# Patient Record
Sex: FEMALE | Race: WHITE
[De-identification: names, ages, dates, MRNs, and addresses within clinical notes are randomized per-mention and may not be internally consistent; named-entity substitution may affect disease eponyms.]

---

## 2021-05-14 ENCOUNTER — HOSPITAL ENCOUNTER (OUTPATIENT)
Dept: HOSPITAL 53 - M SDC | Age: 26
Discharge: HOME | End: 2021-05-14
Attending: SURGERY
Payer: COMMERCIAL

## 2021-05-14 VITALS — BODY MASS INDEX: 24.88 KG/M2 | WEIGHT: 168 LBS | HEIGHT: 69 IN

## 2021-05-14 VITALS — DIASTOLIC BLOOD PRESSURE: 74 MMHG | SYSTOLIC BLOOD PRESSURE: 113 MMHG

## 2021-05-14 DIAGNOSIS — R06.83: ICD-10-CM

## 2021-05-14 DIAGNOSIS — F43.10: ICD-10-CM

## 2021-05-14 DIAGNOSIS — Z88.2: ICD-10-CM

## 2021-05-14 DIAGNOSIS — D17.21: ICD-10-CM

## 2021-05-14 DIAGNOSIS — Z79.899: ICD-10-CM

## 2021-05-14 DIAGNOSIS — Z88.0: ICD-10-CM

## 2021-05-14 DIAGNOSIS — F32.9: ICD-10-CM

## 2021-05-14 DIAGNOSIS — D17.1: Primary | ICD-10-CM

## 2021-05-14 DIAGNOSIS — F41.9: ICD-10-CM

## 2021-05-14 DIAGNOSIS — D17.22: ICD-10-CM

## 2021-05-14 DIAGNOSIS — Z86.16: ICD-10-CM

## 2021-05-14 DIAGNOSIS — Z79.3: ICD-10-CM

## 2021-05-14 PROCEDURE — 12035 INTMD RPR S/A/T/EXT 12.6-20: CPT

## 2021-05-14 PROCEDURE — 11404 EXC TR-EXT B9+MARG 3.1-4 CM: CPT

## 2021-05-14 PROCEDURE — 11402 EXC TR-EXT B9+MARG 1.1-2 CM: CPT

## 2021-05-14 PROCEDURE — 88304 TISSUE EXAM BY PATHOLOGIST: CPT

## 2021-05-14 RX ADMIN — HYDROMORPHONE HYDROCHLORIDE PRN MG: 1 INJECTION, SOLUTION INTRAMUSCULAR; INTRAVENOUS; SUBCUTANEOUS at 15:02

## 2021-05-14 RX ADMIN — HYDROMORPHONE HYDROCHLORIDE PRN MG: 1 INJECTION, SOLUTION INTRAMUSCULAR; INTRAVENOUS; SUBCUTANEOUS at 15:08

## 2021-05-26 NOTE — ROOPDOC
[facility] Report Of Operation


Report of Operation


DATE OF PROCEDURE: 5/17/21





PREPROCEDURE DIAGNOSES: multiple lipomas, left scapular lipoma, bilateral upper 

extremity lipomas.





POSTPROCEDURE DIAGNOSES: multiple lipomas, left scapular lipoma, bilateral upper

extremity lipomas.





PROCEDURE: Excision of multiple lipomas. 





SURGEON: Caroline Akins MD





ASSISTANT: Kinjal Conn NP assisted with retraction during excision of left 

scapular lipoma and with excision of the multiple upper extremity li[mas





ANESTHESIA: General Endotracheal Anesthesia.





ESTIMATED BLOOD LOSS: Approximately 20 mL. 





COMPLICATIONS: none. 





REMARKS: Patient is brought to the operating room for removal of multiple 

lipomas including roughly about a 3-4 cm well-formed lipoma on her left scapula,

 multiple small lipomas on both upper extremities. I've remarked this lipomas in

 the preoperative holding area to a total of 5 small lipomas on the right and 

left arms each and a single left scapular lipoma was marked..





PROCEDURE NOTE: . 





Patient was brought to the operating room, placed initially supine on the table.

 General endotracheal anesthesia was started. Compression boots were placed on 

both lower extremities were DVT prophylaxis. She received Celebrex 400 mg by bladimir

th preoperatively for postoperative pain control. She was placed on the right 

lateral decubitus position with her pressure points padded held in place with 

pillow wedges. The left back area was prepped and draped in the usual sterile 

fashion. The circumflex timeout





She is a well-formed soft tissue lumps overlying the left scapula. The skin and 

soft tissue was liberally infiltrated with local anesthesia using a combination 

of 1% lidocaine and 14% Marcaine. A transverse incision was then created on top 

of the lump and then we proceeded taking this down through to the subcutaneous 

tissue. The overlying fascia to the scapular muscles was opened up and the 

lipoma was located. There is a well-formed fatty soft tissue within the muscles 

over the scapula which was removed circumferentially teasing this off the 

surrounding muscles. After removal of the cavity was palpated and examined for 

proper hemostasis. Once satisfied and closed the overlying fascia with 2-0 

Vicryl, the septations tissue was approximated with 3-0 Vicryl and the skin was 

closed with 4-0 Monocryl in subcutaneous thickening fashion. Dermabond was then 

used for dressing.





We then repositioned her supine with both arms on an armboard and both arms were

 prepped and draped circumferentially. I have premarked the area where her 

lipomas are located and this was in scattered circumferential location. She has 

a total of 5 lipomas and each arm all of which are about a centimeter or so or 

slightly just above 1 cm. There were hardened solid nodularities but overall 

well formed and just superficial in nature. In a similar fashion the surrounding

 skin and subcutaneous tissue was infiltrated with local anesthesia and a 

transverse incision was created on top of this small lipomas and the lipomas 

were circumferentially dissected and removed off the surrounding tissue. The 

skin was then closed in layers with 3-0 Vicryl at the subcutaneous tissue and 4-

0 Monocryl to close the skin. Dermabond was then used to cover the incisions. 

After removal of all the lipomas, she was promptly awake and extubated and 

brought to recovery room in stable condition.











CAROLINE AKINS MD         May 26, 2021 01:46

## 2021-06-01 ENCOUNTER — HOSPITAL ENCOUNTER (INPATIENT)
Dept: HOSPITAL 53 - M ED | Age: 26
LOS: 3 days | Discharge: HOME | DRG: 885 | End: 2021-06-04
Attending: PSYCHIATRY & NEUROLOGY | Admitting: PSYCHIATRY & NEUROLOGY
Payer: COMMERCIAL

## 2021-06-01 VITALS — BODY MASS INDEX: 24.26 KG/M2 | WEIGHT: 163.8 LBS | HEIGHT: 69 IN

## 2021-06-01 DIAGNOSIS — F33.9: Primary | ICD-10-CM

## 2021-06-01 DIAGNOSIS — Z91.048: ICD-10-CM

## 2021-06-01 DIAGNOSIS — Z20.822: ICD-10-CM

## 2021-06-01 DIAGNOSIS — Z88.0: ICD-10-CM

## 2021-06-01 DIAGNOSIS — Z63.79: ICD-10-CM

## 2021-06-01 DIAGNOSIS — Z79.899: ICD-10-CM

## 2021-06-01 DIAGNOSIS — Z88.8: ICD-10-CM

## 2021-06-01 DIAGNOSIS — Z88.2: ICD-10-CM

## 2021-06-01 DIAGNOSIS — Z81.8: ICD-10-CM

## 2021-06-01 LAB
ALBUMIN SERPL BCG-MCNC: 3.6 GM/DL (ref 3.2–5.2)
ALT SERPL W P-5'-P-CCNC: 17 U/L (ref 12–78)
AMPHETAMINES UR QL SCN: NEGATIVE
APAP SERPL-MCNC: < 2 UG/ML (ref 10–30)
B-HCG SERPL QL: NEGATIVE
BARBITURATES UR QL SCN: NEGATIVE
BENZODIAZ UR QL SCN: NEGATIVE
BILIRUB CONJ SERPL-MCNC: 0.1 MG/DL (ref 0–0.2)
BILIRUB SERPL-MCNC: 0.4 MG/DL (ref 0.2–1)
BUN SERPL-MCNC: 9 MG/DL (ref 7–18)
BZE UR QL SCN: NEGATIVE
CALCIUM SERPL-MCNC: 8.9 MG/DL (ref 8.5–10.1)
CANNABINOIDS UR QL SCN: NEGATIVE
CHLORIDE SERPL-SCNC: 106 MEQ/L (ref 98–107)
CO2 SERPL-SCNC: 29 MEQ/L (ref 21–32)
CREAT SERPL-MCNC: 1.02 MG/DL (ref 0.55–1.3)
ETHANOL SERPL-MCNC: < 0.003 % (ref 0–0.01)
GFR SERPL CREATININE-BSD FRML MDRD: > 60 ML/MIN/{1.73_M2} (ref 60–?)
GLUCOSE SERPL-MCNC: 67 MG/DL (ref 70–100)
HCT VFR BLD AUTO: 40.9 % (ref 36–47)
HGB BLD-MCNC: 13.7 G/DL (ref 12–15.5)
MCH RBC QN AUTO: 30.6 PG (ref 27–33)
MCHC RBC AUTO-ENTMCNC: 33.5 G/DL (ref 32–36.5)
MCV RBC AUTO: 91.3 FL (ref 80–96)
METHADONE UR QL SCN: NEGATIVE
OPIATES UR QL SCN: NEGATIVE
PCP UR QL SCN: NEGATIVE
PLATELET # BLD AUTO: 229 10^3/UL (ref 150–450)
POTASSIUM SERPL-SCNC: 3.5 MEQ/L (ref 3.5–5.1)
PROT SERPL-MCNC: 7.1 GM/DL (ref 6.4–8.2)
RBC # BLD AUTO: 4.48 10^6/UL (ref 4–5.4)
SALICYLATES SERPL-MCNC: < 1.7 MG/DL (ref 5–30)
SODIUM SERPL-SCNC: 140 MEQ/L (ref 136–145)
TSH SERPL DL<=0.005 MIU/L-ACNC: 2.44 UIU/ML (ref 0.36–3.74)
WBC # BLD AUTO: 5 10^3/UL (ref 4–10)

## 2021-06-02 VITALS — DIASTOLIC BLOOD PRESSURE: 79 MMHG | SYSTOLIC BLOOD PRESSURE: 121 MMHG

## 2021-06-02 LAB — RSV RNA NPH QL NAA+PROBE: NEGATIVE

## 2021-06-03 VITALS — DIASTOLIC BLOOD PRESSURE: 74 MMHG | SYSTOLIC BLOOD PRESSURE: 122 MMHG

## 2021-06-03 RX ADMIN — BUPROPION HYDROCHLORIDE SCH MG: 100 TABLET, EXTENDED RELEASE ORAL at 09:14

## 2021-06-03 RX ADMIN — BUPROPION HYDROCHLORIDE SCH MG: 100 TABLET, EXTENDED RELEASE ORAL at 00:07

## 2021-06-03 RX ADMIN — BUPROPION HYDROCHLORIDE SCH MG: 150 TABLET, FILM COATED, EXTENDED RELEASE ORAL at 20:27

## 2021-06-03 RX ADMIN — Medication SCH EA: at 13:20

## 2021-06-03 NOTE — MHHPEPDOC
General


Date Of Admission:  2021


Legal Status:  9.39


Chief Complaint


I have been feeling more depressed for the past 3 months ".





History of Present Illness


HISTORY OF THE PRESENT ILLNESS: Patient is a 25 -year-old , female, who

[has a2 year history of depression and been in active treatment, but no prior 

inpatient admissions. Patient reports very little improvement with the 

counseling and medication of Wellbutrin and in the past week she is feeling more

 tired, feeling worthless, hopeless and is finding her job being overwhelming 

and difficult to complete. She is feeling sad, worthless at times hopeless, not 

able to concentrate and in the past few days she was having vague suicidal 

thoughts and came to emergency room by the advice of her counselor. She is 

denying any clear suicidal plan or intent, but is feeling unsafe and wants to 

feel better. She reports multiple stressors, which includes her mother having a 

relapse of breast cancer, recent  death of her father and difficulty in 

maintaining long-term relationship with a boyfriend. She has been receiving 

counseling and Wellbutrin 100 mg twice a day. Has initial minor improvement but 

is not getting much relief anymore.. She denies any alcohol or drug abuse issues

 and no major medical history].





Psychiatric Review of Systems


Depression (2 or more weeks):  depressed mood, anhedonia, feelings of 

worthlesness, decreased energy, difficulty concentrating, suicidal thoughts


Maisha (4 or more days of):  denies


Psychosis:  denies


PTSD:  denies


Anxiety:  gen/non-specific anxiety





Past Psychiatric History


Previous Psychiatric Diagnosis:  Major depression,


Previous Psychiatric Admissions: . No history of inpatient treatment


Suicide Attempts: . No history of attempt


Psychiatric Follow-up: , Currently in outpatient treatment.


Psychiatric medications: ., Wellbutrin  mg twice a day





Past Medical History


Medical Problems


No major medical history


Head Injury:  No


Seizures:  No


Hospitalizations:  No


Surgeries:  No





Family Medical/Psychiatric HX


Medical Problems


Mother has breast cancer. Father  of a brain cancer


Psychiatric Disorders:  Yes


Addiction:  No


Suicide Attemps/Completions:  No (. Mother has a serious depression and father 

had to depression. Also)





Addiction History


denies





Social History


Childhood: Normal childhood .


Abuse/Trauma:[No history of abuse].


Current [place] Sit[place]


Education: [High school].


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


Social Support: [, Supportive boyfriend, and the mother].


Legal: .. No legal issues


Marital: . in steady relationship for 2 years





Mental Status Examination


General Appearance:  well groomed, appears stated age


Build:  average


Demeanor:  average


Eye Contact:  average


Activity:  average


Behavior:  cooperative


Speech:  clear, spontaneous, normal volume


Mood:  depressed, anxious


Affect:  full


Thought Process:  logical/linear


Thought Content (Delusions):  none reported


Thought Content (Other):  none reported


Thought Content (Aggressive):  none reported


Perception (Hallucinations):  none reported


Perception (Other):  none reported


Cognition (Impairment of):  none reported


Cognition(Intelligence Est.):  average


Oriented:  Awake, Alert, Oriented times three


Insight:  fair


Judgment:  Good


Psychosis:  Denies





Diagnoses


Major depression, recurrent





A-FIB/CHADSVASC


A-FIB History


Current/History of A-Fib/PAF?:  No


Current PO Anticoag Therapy:  No





Age/Risk Factor Scoring


CHADSVASC:  








CHADSVASC Response (Comments) Value


 


Gender Risk Factor Female 1


 


Hx of CHF No 0


 


Hx of HTN No 0


 


Hx of Stroke/TIA/or VTE No 0


 


Hx of Diabetes No 0


 


Hx of Vascular Disease No 0


 


Total  1











Treatment


Treatment ordered:  NONE





Assessment


Markedly depressed, but with a good insight and fair judgment and is willing to 

cooperate with the treatment. We will increase the Wellbutrin up to 400 mg 

daily. Continue the supportive therapy to stabilize. He does not appear to be 

acute suicidal risk





Initial Treatment Plan


1. Patient was admitted on a 9.39 status.


2. Complete history was obtained.


3. With patients permission, family will be contacted and database will be 

expanded. 


4. Patients medication regimen will be reviewed and changed accordingly. 


5. Patient will be provided with protected environment. 


6. Patient will be treated with individual, group, and milieu therapies. 


7. Patient will receive supportive psych-education.


8. Discharge planning will commence immediately.


9. Outpatient follow-up treatment will be strongly recommended.


10. The initial treatment plan will focus initially on:


* Depression.


* Risk for suicide.





ESTIMATED LENGTH OF STAY: 5-7 DAYS.





TIME SPENT COUNSELING AND COORDINATING INITIAL CARE: 45 minutes.





Tobacco Cessation Screen


If Patient is a Smoker


Nonsmoker


N/A-No Antipsychotics





Vital Signs





Vital Signs








  Date Time  Temp Pulse Resp B/P (MAP) Pulse Ox O2 Delivery O2 Flow Rate FiO2


 


21 23:40 99.4 87 16 121/79 (93) 100 Room Air  











Laboratory Data


[facility]


Laboratory Tests 2


21 13:05: 


Coronavirus (COVID-19)(PCR) NEGATIVE, Influenza Type A (RT-PCR) NEGATIVE, 

Influenza Type B (RT-PCR) NEGATIVE, Respiratory Syncytial Virus (PCR) NEGATIVE





Medications


Scheduled


Bupropion HCl (Wellbutrin Sr) 100 Mg Tab.sr.12h, 100 MG PO BID, (Reported)


Norethindrone-Ethinyl Estrad (Cyclafem 1-35-28 Tablet) 1 Each Tablet, 1 TAB PO 

DAILY, (Reported)





Allergies


Coded Allergies:  


     povidone-iodine (Verified  Allergy, Intermediate, hives, 21)


     soap (Verified  Allergy, Intermediate, hives, 21)


     Sulfa (Sulfonamide Antibiotics) (Verified  Allergy, Unknown, pins/needles-

itchy, 21)


     amoxicillin (Verified  Allergy, Unknown, hives-swelling of throat, 21)











KRISTEL MONZON M.D.                   Cabrera 3, 2021 12:28

## 2021-06-03 NOTE — HPEPDOC
General


Date of Admission


Jun 2, 2021 at 21:40


Date of Service:  Cabrera 3, 2021


Chief Complaint


The patient is a 25-year-old female admitted with a reason for visit of 

Unspecified Depressive Do.


Source:  Patient, RN/MD





History of Present Illness


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

She is being examined here for medical history and physical.  Patient reports 

that she has been so fatigued this weekend that she just wanted to die. She 

reports that she has [place] for this chronic fatigue going on for 

years and she is being worked up for autoimmune disease. She has been referred 

to a Rheumatologist for this . She had multiple special tests, hormone studies 

etc. She also complains of intermittent episodes of SOB and Wheezing. Denies 

having any PFT before. Also complains of constipation.





Home Medications


Scheduled


Bupropion HCl (Wellbutrin Sr) 100 Mg Tab.sr.12h, 100 MG PO BID, (Reported)


Norethindrone-Ethinyl Estrad (Cyclafem 1-35-28 Tablet) 1 Each Tablet, 1 TAB PO 

DAILY, (Reported)





Allergies


Coded Allergies:  


     povidone-iodine (Verified  Allergy, Intermediate, hives, 5/14/21)


     soap (Verified  Allergy, Intermediate, hives, 5/14/21)


     Sulfa (Sulfonamide Antibiotics) (Verified  Allergy, Unknown, pins/needles-

itchy, 4/29/21)


     amoxicillin (Verified  Allergy, Unknown, hives-swelling of throat, 4/29/21)





Past Medical History


Medical History


Chronic Fatigue being worked up for Autoimmune disease by PMD.


Surgical History


Multiple lipomas removed from back, fore arms.





Family History


Patient's mom was adapted so does not have much family hisotry. Mother is 

healthy. Did not know her father.





Social History


* Smoker:  non-smoker


Drugs:  denies





A-FIB/CHADSVASC


A-FIB History


Current/History of A-Fib/PAF?:  No





Age/Risk Factor Scoring


CHADSVASC:  








CHADSVASC Response (Comments) Value


 


Gender Risk Factor Female 1


 


Hx of CHF No 0


 


Hx of HTN No 0


 


Hx of Stroke/TIA/or VTE No 0


 


Hx of Diabetes No 0


 


Hx of Vascular Disease No 0


 


Total  1











Review of Systems


Constitutional:  Reports: Fatigue; 


   Denies: Chills, Fever, Night Sweats


Eyes:  Denies: Pain, Vision change


ENT:  Denies: Head Aches, Ear Pain, Dysphagia


Skin:  Denies: Rash, Lesions, Breakdown


Pulmonary:  Reports: Cough


Cardiovascular:  Denies: Chest Pain, Palpitations, Orthopnea, Paroxysmal Noc. 

Dyspnea, Lt Headedness


Gastrointestinal:  Reports: Constipation; 


   Denies: Nausea, Vomiting, Abdominal Pain, Diarrhea


Genitourinary:  Denies: Dysuria, Frequency, Incontinence, Retention





Physical Examination


General Exam:  Positive: Alert, Cooperative, No Acute Distress


Eye Exam:  Positive: PERRLA, Conjunctiva & lids normal, EOMI; 


   Negative: Sclera icteric


ENT Exam:  Positive: Atraumatic, Mucous membr. moist/pink, Pharynx Normal


Neck Exam:  Positive: Supple; 


   Negative: JVD, thyromegaly


Chest Exam:  Positive: Clear to auscultation, Normal air movement


Heart Exam:  Positive: Rate Normal, Regular Rhythm, Normal S1, Normal S2; 


   Negative: Murmurs, Rubs


Abdomen Exam:  Positive: Normal bowel sounds, Soft; 


   Negative: Tenderness, Hepatospenomegaly


Extremity Exam:  Positive: Normal pulses; 


   Negative: Clubbing, Cyanosis, Edema





Vital Signs





Vital Signs








  Date Time  Temp Pulse Resp B/P (MAP) Pulse Ox O2 Delivery O2 Flow Rate FiO2


 


6/2/21 23:40 99.4 87 16 121/79 (93) 100 Room Air  











 Assessment/Plan


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

She is being examined here for medical history and physical. 





Depression


As per [facility]. 





No acute medical problems at this time.





Plan / VTE


VTE Prophylaxis Ordered?:  No











PAIGE WARREN MD                    Cabrera 3, 2021 14:18

## 2021-06-04 VITALS — SYSTOLIC BLOOD PRESSURE: 108 MMHG | DIASTOLIC BLOOD PRESSURE: 55 MMHG

## 2021-06-04 RX ADMIN — Medication SCH EA: at 08:48

## 2021-06-04 RX ADMIN — BUPROPION HYDROCHLORIDE SCH MG: 150 TABLET, FILM COATED, EXTENDED RELEASE ORAL at 08:49

## 2021-06-04 NOTE — MHDSPDOC
[facility] Discharge Summary


Discharge Summary


DATE OF ADMISSION: Jun 2, 2021 at 21:40 


DATE OF DISCHARGE: Jun 4, 2021 at 10:18





DISCHARGE DIAGNOSES:


1. . Major depression, recurrent


2. .





REASON FOR ADMISSION: Has a long history of depression and has been experiencing

increasing symptoms of hopelessness, helplessness, with the poor energy and 

concentration, and vague suicidal thoughts





CONSULTANTS INVOLVED: , None





TREATMENT AND PROGRESS ON THE UNIT :  Patient was seen for supportive therapy. 

Lethality evaluation, and Wellbutrin was increased to 150 mg twice a day. 

Patient is fully cooperated, maintained good control, and denied any suicidal 

thoughts. She. She is feeling very anxious and uneasy on the inpatient unit and 

is requesting discharge. There was a acutely agitated patients on the unit and 

apparently some threats were made and she does not feel safe and is asking for 

discharge and has a good deal of support and safety network and is willing to 

continue outpatient treatment. She is tolerating Wellbutrin okay and denies any 

suicidal thoughts..





HOSPITAL COURSE: Remained in good control, pleasant, cooperative, and denies any

suicidal thoughts





DISCHARGE ASSESSMENT: The patient is stable and did not appear acutely suicidal





MENTAL STATUS EXAMINATION ON DISCHARGE: 


Patient is a 25-year old female, who is , pleasant, cooperative.


Speech is productive, organized.


Language skills are good.


Thought processes including: Well organized


Thought content: . No psychotic symptoms. No suicidal thoughts.


Abstract reasoning, and computation: [Good].


Description of associations: [Well-organized].


Description of abnormal or psychotic thoughts: [Non-].


Judgment: [Good].


Insight: [Good].


Orientation to [, well oriented, good].


Recent and remote memory: [Good].


Attention span and concentration:  Good.


Language: .


Fund of knowledge: .


Mood: [, Not as depressed].


Affect: [, Bright and appropriate].





MEDICATIONS ON DISCHARGE:


- , Wellbutrin  mg twice a day for [7 days with 3 refills].


-  for .


-  for .





PLAN/FOLLOWUP ARRANGEMENTS: [Arranged by the discharge planner].





The amount of time spent in the coordination of care for this patient was 

approximately [35] minutes.





ETOH/Disorder Med Rx


ETOH/DRUG DISORDER RX:  N/A





Vital Signs/I&Os





Vital Signs








  Date Time  Temp Pulse Resp B/P (MAP) Pulse Ox O2 Delivery O2 Flow Rate FiO2


 


6/4/21 06:19 99.5 104 16 108/55 (72) 100 Room Air  











Medications


Scheduled


Bupropion HCl (Bupropion HCl Sr) 150 Mg Tab.er.12h, 150 MG PO BID for depression

 for 7 Days, #14


Norethindrone-Ethinyl Estrad (Cyclafem 1-35-28 Tablet) 1 Each Tablet, 1 TAB PO 

DAILY, (Reported)





Allergies


Coded Allergies:  


     povidone-iodine (Verified  Allergy, Intermediate, hives, 5/14/21)


     soap (Verified  Allergy, Intermediate, hives, 5/14/21)


     Sulfa (Sulfonamide Antibiotics) (Verified  Allergy, Unknown, pins/needles-

itchy, 4/29/21)


     amoxicillin (Verified  Allergy, Unknown, hives-swelling of throat, 4/29/21)











KRISTEL MONZON M.D.                   Jun 4, 2021 12:40